# Patient Record
(demographics unavailable — no encounter records)

---

## 2025-05-27 NOTE — HISTORY OF PRESENT ILLNESS
[FreeTextEntry1] : CC: gender dysphoria  Jazmine has been considering vaginoplasty for 3 years She has been living as female since starting college (2017) She is currently unemployed.  Here today to discuss vaginoplasty Pronouns: She/Her  She has been on hormone therapy since 7/2017   Her goals are natural female appearance, sit to urinate, sensation and penetrative intercourse  She first thought of vaginoplasty several years ago, but has waited to be sure so as not to have regret.  She has been working with a therapist about transitioning since she was 17 (9 years ago), confirming the diagnosis of gender dysphoria  This is a male-to-female transgender patient who presents with male genitalia and associated gender dysphoria that is well-documented.  For the 8 years they have been living as a female in public and at work. She has been on hormone therapy for greater than 8 years. This condition has a significant impact on their activities of daily living including hygiene, exercise and overall mental well-being. I believe they would benefit greatly from staged gender affirmation surgery.   They meet all WPATH criteria according to the latest Standards of Care to proceed with gender affirming surgery. These include 1. Persistent, well documented gender dysphoria 2. Capacity to make a fully informed decision and consent for treatment 3. Age of majority in [place] 4. If significant medical or mental health concerns are present, they must be reasonably well controlled  In addition, prior to genital affirmation surgery, they will meet the following criteria 1. 12 continuous months of hormone therapy as appropriate to the patients gender goals (unless not clinically indicated for the individual 2. 12 continuous months of living as the desired gender in public and at work or school  We will plan for combined approach with Plastic Surgery, this will be robotic-assisted, penile-inversion technique including penectomy, orchiectomy, creation of wen neurovascular les-clitoral flap from glans penis, vaginoplasty, peritoneal graft for les-vaginal lining, along with the creation of a potential space for the les-vaginal canal along the rectoprostatic plane along Denonvilliers fascia via use of the da Viral Robot (Urology  The risks, benefits and alternatives to treatment were discussed at length with the patient. Potential complications were discussed as well and include but are not limit to infection, bleeding, scarring, PE/DVT and even death. In addition, specific complications include dehiscence, injury to bowel or bladder resulting in incontinence, a anorgasmia. All questions were answered in detail. They understand and wish to proceed. Informed consent was obtained  Peter Qureshi MD, FACS, [facility]  of Urology [facility] Director of Laparoscopic and Robotic Surgery [facility] Director of Urology, Health system Professor of Urology   (Office) [phone] (Cell)  [phone] [email or web address]  The total amount of time I have personally spent preparing for this visit, reviewing the patient's test results, obtaining external history, ordering tests/medications, documenting clinical information, communicating with and counseling the patient/family and/or caregiver(s), and spent face to face with the patient explaining the above was minutes = 45